# Patient Record
Sex: MALE | Race: WHITE | Employment: OTHER | URBAN - METROPOLITAN AREA
[De-identification: names, ages, dates, MRNs, and addresses within clinical notes are randomized per-mention and may not be internally consistent; named-entity substitution may affect disease eponyms.]

---

## 2021-02-27 ENCOUNTER — IMMUNIZATION (OUTPATIENT)
Dept: NURSING | Facility: CLINIC | Age: 86
End: 2021-02-27
Payer: MEDICARE

## 2021-02-27 PROCEDURE — 91301 PR COVID VAC MODERNA 100 MCG/0.5 ML IM: CPT

## 2021-02-27 PROCEDURE — 0011A PR COVID VAC MODERNA 100 MCG/0.5 ML IM: CPT

## 2021-03-27 ENCOUNTER — IMMUNIZATION (OUTPATIENT)
Dept: NURSING | Facility: CLINIC | Age: 86
End: 2021-03-27
Attending: INTERNAL MEDICINE
Payer: MEDICARE

## 2021-03-27 PROCEDURE — 91301 PR COVID VAC MODERNA 100 MCG/0.5 ML IM: CPT

## 2021-03-27 PROCEDURE — 0012A PR COVID VAC MODERNA 100 MCG/0.5 ML IM: CPT

## 2023-06-06 ENCOUNTER — TRANSFERRED RECORDS (OUTPATIENT)
Dept: HEALTH INFORMATION MANAGEMENT | Facility: CLINIC | Age: 88
End: 2023-06-06
Payer: MEDICARE

## 2023-07-12 ENCOUNTER — ANESTHESIA EVENT (OUTPATIENT)
Dept: SURGERY | Facility: CLINIC | Age: 88
End: 2023-07-12
Payer: MEDICARE

## 2023-07-12 RX ORDER — TRIAMCINOLONE ACETONIDE 1 MG/G
CREAM TOPICAL 2 TIMES DAILY
COMMUNITY

## 2023-07-12 RX ORDER — ATORVASTATIN CALCIUM 20 MG/1
20 TABLET, FILM COATED ORAL DAILY
COMMUNITY

## 2023-07-12 RX ORDER — AZITHROMYCIN 250 MG/1
250 TABLET, FILM COATED ORAL DAILY
Status: ON HOLD | COMMUNITY
End: 2023-07-13

## 2023-07-12 RX ORDER — LEVOTHYROXINE SODIUM 88 UG/1
88 TABLET ORAL DAILY
COMMUNITY

## 2023-07-12 RX ORDER — CHLORAL HYDRATE 500 MG
2 CAPSULE ORAL DAILY
COMMUNITY

## 2023-07-12 RX ORDER — IPRATROPIUM BROMIDE AND ALBUTEROL SULFATE .5; 3 MG/3ML; MG/3ML
1 SOLUTION RESPIRATORY (INHALATION) EVERY 6 HOURS PRN
COMMUNITY

## 2023-07-12 RX ORDER — MONTELUKAST SODIUM 10 MG/1
10 TABLET ORAL AT BEDTIME
COMMUNITY

## 2023-07-12 ASSESSMENT — COPD QUESTIONNAIRES
COPD: 1
CAT_SEVERITY: MILD

## 2023-07-12 NOTE — ANESTHESIA PREPROCEDURE EVALUATION
Anesthesia Pre-Procedure Evaluation    Patient: Héctor Black   MRN: [id number] : 1935        Procedure : Procedure(s):  ROBOTIC BILATERAL INGUINAL HERNIA REPAIR WITH MESH POSSIBLE OPEN          Past Medical History:   Diagnosis Date     Arrhythmia     New QT Prolongation     Cerebral artery occlusion with cerebral infarction (H)     Possible TIA     COPD (chronic obstructive pulmonary disease) (H)      Coronary artery disease      Hyperlipidemia      Hypertension      Hypothyroidism      Nasal sinus polyp      Uncomplicated asthma       Past Surgical History:   Procedure Laterality Date     APPENDECTOMY       CARDIAC SURGERY      ANGIOPLASTY     ENT SURGERY      SINUS SURGERY, SINUS POLYPECTOMY     EYE SURGERY      CATARACT EXTRACTION- RIGHT & LEFT     GENITOURINARY SURGERY      TURP     GI SURGERY       LAPAROSCOPIC HERNIORRHAPHY HIATAL       ORTHOPEDIC SURGERY      TOTAL KNEE ARTHROPLASTY- RIGHT & LEFT      Allergies   Allergen Reactions     Amoxicillin Hives     Penicillins Hives     Levaquin [Levofloxacin] Rash     Sulfa Antibiotics Rash      Social History     Tobacco Use     Smoking status: Former     Types: Cigars     Quit date: 1986     Years since quittin.4     Smokeless tobacco: Never   Substance Use Topics     Alcohol use: Yes     Comment: occasionally- 2/WEEK      Wt Readings from Last 1 Encounters:   09/10/13 94.4 kg (208 lb 1.6 oz)        Anesthesia Evaluation            ROS/MED HX  ENT/Pulmonary:     (+) asthma mild,  COPD,     Neurologic:     (+) TIA, date: ,     Cardiovascular:     (+) Dyslipidemia hypertension--CAD --- (-) stent   METS/Exercise Tolerance:     Hematologic:       Musculoskeletal: Comment: BIH      GI/Hepatic:    (-) GERD   Renal/Genitourinary:  - neg Renal ROS     Endo:     (+) thyroid problem, hypothyroidism,     Psychiatric/Substance Use:       Infectious Disease:       Malignancy:       Other:            Physical Exam    Airway        Mallampati: II    TM distance: > 3 FB   Neck ROM: full   Mouth opening: > 3 cm    Respiratory Devices and Support         Dental           Cardiovascular   cardiovascular exam normal          Pulmonary   pulmonary exam normal                OUTSIDE LABS:  CBC:   Lab Results   Component Value Date    WBC 10.3 06/02/2016    HGB 15.0 06/02/2016    HCT 44.6 06/02/2016     06/02/2016     BMP:   Lab Results   Component Value Date     06/02/2016     05/09/2013    POTASSIUM 4.3 06/02/2016    POTASSIUM 4.3 05/09/2013    CHLORIDE 108 06/02/2016    CHLORIDE 102 05/09/2013    CO2 23 06/02/2016    CO2 25 05/09/2013    BUN 31 (H) 06/02/2016    BUN 22 05/09/2013    CR 1.07 06/02/2016    CR 1.17 05/09/2013    GLC 98 06/02/2016    GLC 87 05/09/2013     COAGS:   Lab Results   Component Value Date    INR 0.96 06/02/2016     POC: No results found for: BGM, HCG, HCGS  HEPATIC:   Lab Results   Component Value Date    ALBUMIN 4.4 05/09/2013    PROTTOTAL 7.7 05/09/2013    ALT 29 05/09/2013    AST 31 05/09/2013    ALKPHOS 56 05/09/2013    BILITOTAL 0.6 05/09/2013     OTHER:   Lab Results   Component Value Date    HEYDI 8.7 06/02/2016       Anesthesia Plan    ASA Status:  2   NPO Status:  NPO Appropriate    Anesthesia Type: General.     - Airway: ETT   Induction: Intravenous, Propofol.   Maintenance: Balanced.        Consents    Anesthesia Plan(s) and associated risks, benefits, and realistic alternatives discussed. Questions answered and patient/representative(s) expressed understanding.    - Discussed:     - Discussed with:  Patient         Postoperative Care    Pain management: IV analgesics.   PONV prophylaxis: Ondansetron (or other 5HT-3)     Comments:                Perez Schroeder, , DO

## 2023-07-13 ENCOUNTER — HOSPITAL ENCOUNTER (OUTPATIENT)
Facility: CLINIC | Age: 88
Discharge: HOME | End: 2023-07-13
Attending: SURGERY | Admitting: SURGERY
Payer: MEDICARE

## 2023-07-13 ENCOUNTER — ANESTHESIA (OUTPATIENT)
Dept: SURGERY | Facility: CLINIC | Age: 88
End: 2023-07-13
Payer: MEDICARE

## 2023-07-13 VITALS
RESPIRATION RATE: 16 BRPM | SYSTOLIC BLOOD PRESSURE: 144 MMHG | BODY MASS INDEX: 30.08 KG/M2 | HEART RATE: 62 BPM | TEMPERATURE: 98 F | WEIGHT: 210.1 LBS | DIASTOLIC BLOOD PRESSURE: 72 MMHG | OXYGEN SATURATION: 96 % | HEIGHT: 70 IN

## 2023-07-13 DIAGNOSIS — Z87.19 S/P INGUINAL HERNIA REPAIR: Primary | ICD-10-CM

## 2023-07-13 DIAGNOSIS — Z98.890 S/P INGUINAL HERNIA REPAIR: Primary | ICD-10-CM

## 2023-07-13 PROCEDURE — 258N000003 HC RX IP 258 OP 636: Performed by: NURSE ANESTHETIST, CERTIFIED REGISTERED

## 2023-07-13 PROCEDURE — 250N000011 HC RX IP 250 OP 636: Performed by: STUDENT IN AN ORGANIZED HEALTH CARE EDUCATION/TRAINING PROGRAM

## 2023-07-13 PROCEDURE — 272N000001 HC OR GENERAL SUPPLY STERILE: Performed by: SURGERY

## 2023-07-13 PROCEDURE — 250N000011 HC RX IP 250 OP 636: Performed by: NURSE ANESTHETIST, CERTIFIED REGISTERED

## 2023-07-13 PROCEDURE — 360N000080 HC SURGERY LEVEL 7, PER MIN: Performed by: SURGERY

## 2023-07-13 PROCEDURE — 250N000009 HC RX 250: Performed by: NURSE ANESTHETIST, CERTIFIED REGISTERED

## 2023-07-13 PROCEDURE — 710N000012 HC RECOVERY PHASE 2, PER MINUTE: Performed by: SURGERY

## 2023-07-13 PROCEDURE — 250N000025 HC SEVOFLURANE, PER MIN: Performed by: SURGERY

## 2023-07-13 PROCEDURE — C1781 MESH (IMPLANTABLE): HCPCS | Performed by: SURGERY

## 2023-07-13 PROCEDURE — 999N000141 HC STATISTIC PRE-PROCEDURE NURSING ASSESSMENT: Performed by: SURGERY

## 2023-07-13 PROCEDURE — 370N000017 HC ANESTHESIA TECHNICAL FEE, PER MIN: Performed by: SURGERY

## 2023-07-13 PROCEDURE — 250N000009 HC RX 250: Performed by: SURGERY

## 2023-07-13 PROCEDURE — 250N000013 HC RX MED GY IP 250 OP 250 PS 637: Performed by: STUDENT IN AN ORGANIZED HEALTH CARE EDUCATION/TRAINING PROGRAM

## 2023-07-13 PROCEDURE — 710N000009 HC RECOVERY PHASE 1, LEVEL 1, PER MIN: Performed by: SURGERY

## 2023-07-13 DEVICE — MESH DEXTILE ANATOMICAL 15CM X 10CM LG LEFT DXT1510AL: Type: IMPLANTABLE DEVICE | Site: INGUINAL | Status: FUNCTIONAL

## 2023-07-13 DEVICE — MESH DEXTILE ANATOMICAL 15CM X 10CM LG RIGHT DXT1510AR: Type: IMPLANTABLE DEVICE | Site: INGUINAL | Status: FUNCTIONAL

## 2023-07-13 RX ORDER — TRAMADOL HYDROCHLORIDE 50 MG/1
50 TABLET ORAL EVERY 6 HOURS PRN
Qty: 12 TABLET | Refills: 0 | Status: SHIPPED | OUTPATIENT
Start: 2023-07-13 | End: 2023-07-16

## 2023-07-13 RX ORDER — DEXAMETHASONE SODIUM PHOSPHATE 4 MG/ML
INJECTION, SOLUTION INTRA-ARTICULAR; INTRALESIONAL; INTRAMUSCULAR; INTRAVENOUS; SOFT TISSUE PRN
Status: DISCONTINUED | OUTPATIENT
Start: 2023-07-13 | End: 2023-07-13

## 2023-07-13 RX ORDER — ONDANSETRON 2 MG/ML
INJECTION INTRAMUSCULAR; INTRAVENOUS PRN
Status: DISCONTINUED | OUTPATIENT
Start: 2023-07-13 | End: 2023-07-13

## 2023-07-13 RX ORDER — BUPIVACAINE HYDROCHLORIDE AND EPINEPHRINE 2.5; 5 MG/ML; UG/ML
INJECTION, SOLUTION INFILTRATION; PERINEURAL PRN
Status: DISCONTINUED | OUTPATIENT
Start: 2023-07-13 | End: 2023-07-13 | Stop reason: HOSPADM

## 2023-07-13 RX ORDER — METHOCARBAMOL 750 MG/1
750 TABLET, FILM COATED ORAL
Status: DISCONTINUED | OUTPATIENT
Start: 2023-07-13 | End: 2023-07-13 | Stop reason: HOSPADM

## 2023-07-13 RX ORDER — SODIUM CHLORIDE, SODIUM LACTATE, POTASSIUM CHLORIDE, CALCIUM CHLORIDE 600; 310; 30; 20 MG/100ML; MG/100ML; MG/100ML; MG/100ML
INJECTION, SOLUTION INTRAVENOUS CONTINUOUS PRN
Status: DISCONTINUED | OUTPATIENT
Start: 2023-07-13 | End: 2023-07-13

## 2023-07-13 RX ORDER — FENTANYL CITRATE 0.05 MG/ML
25 INJECTION, SOLUTION INTRAMUSCULAR; INTRAVENOUS EVERY 5 MIN PRN
Status: DISCONTINUED | OUTPATIENT
Start: 2023-07-13 | End: 2023-07-13 | Stop reason: HOSPADM

## 2023-07-13 RX ORDER — GABAPENTIN 600 MG/1
600 TABLET ORAL ONCE
Status: COMPLETED | OUTPATIENT
Start: 2023-07-13 | End: 2023-07-13

## 2023-07-13 RX ORDER — HYDROCODONE BITARTRATE AND ACETAMINOPHEN 5; 325 MG/1; MG/1
1 TABLET ORAL
Status: DISCONTINUED | OUTPATIENT
Start: 2023-07-13 | End: 2023-07-13 | Stop reason: HOSPADM

## 2023-07-13 RX ORDER — ONDANSETRON 2 MG/ML
4 INJECTION INTRAMUSCULAR; INTRAVENOUS EVERY 30 MIN PRN
Status: DISCONTINUED | OUTPATIENT
Start: 2023-07-13 | End: 2023-07-13 | Stop reason: HOSPADM

## 2023-07-13 RX ORDER — LIDOCAINE HYDROCHLORIDE 20 MG/ML
INJECTION, SOLUTION INFILTRATION; PERINEURAL PRN
Status: DISCONTINUED | OUTPATIENT
Start: 2023-07-13 | End: 2023-07-13

## 2023-07-13 RX ORDER — HYDROMORPHONE HCL IN WATER/PF 6 MG/30 ML
0.2 PATIENT CONTROLLED ANALGESIA SYRINGE INTRAVENOUS EVERY 5 MIN PRN
Status: DISCONTINUED | OUTPATIENT
Start: 2023-07-13 | End: 2023-07-13 | Stop reason: HOSPADM

## 2023-07-13 RX ORDER — HYDROMORPHONE HCL IN WATER/PF 6 MG/30 ML
0.4 PATIENT CONTROLLED ANALGESIA SYRINGE INTRAVENOUS EVERY 5 MIN PRN
Status: DISCONTINUED | OUTPATIENT
Start: 2023-07-13 | End: 2023-07-13 | Stop reason: HOSPADM

## 2023-07-13 RX ORDER — MAGNESIUM HYDROXIDE 1200 MG/15ML
LIQUID ORAL PRN
Status: DISCONTINUED | OUTPATIENT
Start: 2023-07-13 | End: 2023-07-13 | Stop reason: HOSPADM

## 2023-07-13 RX ORDER — DEXMEDETOMIDINE HYDROCHLORIDE 4 UG/ML
INJECTION, SOLUTION INTRAVENOUS PRN
Status: DISCONTINUED | OUTPATIENT
Start: 2023-07-13 | End: 2023-07-13

## 2023-07-13 RX ORDER — SODIUM CHLORIDE, SODIUM LACTATE, POTASSIUM CHLORIDE, CALCIUM CHLORIDE 600; 310; 30; 20 MG/100ML; MG/100ML; MG/100ML; MG/100ML
INJECTION, SOLUTION INTRAVENOUS CONTINUOUS
Status: DISCONTINUED | OUTPATIENT
Start: 2023-07-13 | End: 2023-07-13 | Stop reason: HOSPADM

## 2023-07-13 RX ORDER — PROPOFOL 10 MG/ML
INJECTION, EMULSION INTRAVENOUS CONTINUOUS PRN
Status: DISCONTINUED | OUTPATIENT
Start: 2023-07-13 | End: 2023-07-13

## 2023-07-13 RX ORDER — CEFAZOLIN SODIUM/WATER 2 G/20 ML
2 SYRINGE (ML) INTRAVENOUS
Status: COMPLETED | OUTPATIENT
Start: 2023-07-13 | End: 2023-07-13

## 2023-07-13 RX ORDER — ACETAMINOPHEN 325 MG/1
650 TABLET ORAL
Status: DISCONTINUED | OUTPATIENT
Start: 2023-07-13 | End: 2023-07-13 | Stop reason: HOSPADM

## 2023-07-13 RX ORDER — ACETAMINOPHEN 325 MG/1
975 TABLET ORAL ONCE
Status: COMPLETED | OUTPATIENT
Start: 2023-07-13 | End: 2023-07-13

## 2023-07-13 RX ORDER — ONDANSETRON 4 MG/1
4 TABLET, ORALLY DISINTEGRATING ORAL
Status: DISCONTINUED | OUTPATIENT
Start: 2023-07-13 | End: 2023-07-13 | Stop reason: HOSPADM

## 2023-07-13 RX ORDER — PROPOFOL 10 MG/ML
INJECTION, EMULSION INTRAVENOUS PRN
Status: DISCONTINUED | OUTPATIENT
Start: 2023-07-13 | End: 2023-07-13

## 2023-07-13 RX ORDER — CELECOXIB 200 MG/1
200 CAPSULE ORAL ONCE
Status: COMPLETED | OUTPATIENT
Start: 2023-07-13 | End: 2023-07-13

## 2023-07-13 RX ORDER — FENTANYL CITRATE 50 UG/ML
INJECTION, SOLUTION INTRAMUSCULAR; INTRAVENOUS PRN
Status: DISCONTINUED | OUTPATIENT
Start: 2023-07-13 | End: 2023-07-13

## 2023-07-13 RX ORDER — CEFAZOLIN SODIUM/WATER 2 G/20 ML
2 SYRINGE (ML) INTRAVENOUS SEE ADMIN INSTRUCTIONS
Status: DISCONTINUED | OUTPATIENT
Start: 2023-07-13 | End: 2023-07-13 | Stop reason: HOSPADM

## 2023-07-13 RX ORDER — ONDANSETRON 4 MG/1
4 TABLET, ORALLY DISINTEGRATING ORAL EVERY 30 MIN PRN
Status: DISCONTINUED | OUTPATIENT
Start: 2023-07-13 | End: 2023-07-13 | Stop reason: HOSPADM

## 2023-07-13 RX ORDER — FENTANYL CITRATE 0.05 MG/ML
50 INJECTION, SOLUTION INTRAMUSCULAR; INTRAVENOUS EVERY 5 MIN PRN
Status: DISCONTINUED | OUTPATIENT
Start: 2023-07-13 | End: 2023-07-13 | Stop reason: HOSPADM

## 2023-07-13 RX ADMIN — SODIUM CHLORIDE, POTASSIUM CHLORIDE, SODIUM LACTATE AND CALCIUM CHLORIDE: 600; 310; 30; 20 INJECTION, SOLUTION INTRAVENOUS at 09:05

## 2023-07-13 RX ADMIN — HYDROMORPHONE HYDROCHLORIDE 0.5 MG: 1 INJECTION, SOLUTION INTRAMUSCULAR; INTRAVENOUS; SUBCUTANEOUS at 11:32

## 2023-07-13 RX ADMIN — FENTANYL CITRATE 100 MCG: 50 INJECTION, SOLUTION INTRAMUSCULAR; INTRAVENOUS at 11:00

## 2023-07-13 RX ADMIN — DEXMEDETOMIDINE HYDROCHLORIDE 12 MCG: 200 INJECTION INTRAVENOUS at 11:39

## 2023-07-13 RX ADMIN — GABAPENTIN 600 MG: 600 TABLET, FILM COATED ORAL at 09:31

## 2023-07-13 RX ADMIN — SUGAMMADEX 200 MG: 100 INJECTION, SOLUTION INTRAVENOUS at 13:04

## 2023-07-13 RX ADMIN — ACETAMINOPHEN 975 MG: 325 TABLET, FILM COATED ORAL at 09:31

## 2023-07-13 RX ADMIN — PHENYLEPHRINE HYDROCHLORIDE 0.2 MCG/KG/MIN: 10 INJECTION INTRAVENOUS at 11:56

## 2023-07-13 RX ADMIN — ONDANSETRON 4 MG: 2 INJECTION INTRAMUSCULAR; INTRAVENOUS at 12:52

## 2023-07-13 RX ADMIN — Medication 2 G: at 11:03

## 2023-07-13 RX ADMIN — CELECOXIB 200 MG: 200 CAPSULE ORAL at 09:31

## 2023-07-13 RX ADMIN — ROCURONIUM BROMIDE 10 MG: 50 INJECTION, SOLUTION INTRAVENOUS at 12:27

## 2023-07-13 RX ADMIN — DEXMEDETOMIDINE HYDROCHLORIDE 8 MCG: 200 INJECTION INTRAVENOUS at 11:46

## 2023-07-13 RX ADMIN — ROCURONIUM BROMIDE 50 MG: 50 INJECTION, SOLUTION INTRAVENOUS at 11:00

## 2023-07-13 RX ADMIN — LIDOCAINE HYDROCHLORIDE 80 MG: 20 INJECTION, SOLUTION INFILTRATION; PERINEURAL at 11:00

## 2023-07-13 RX ADMIN — ROCURONIUM BROMIDE 10 MG: 50 INJECTION, SOLUTION INTRAVENOUS at 12:44

## 2023-07-13 RX ADMIN — PROPOFOL 20 MCG/KG/MIN: 10 INJECTION, EMULSION INTRAVENOUS at 11:02

## 2023-07-13 RX ADMIN — PHENYLEPHRINE HYDROCHLORIDE 100 MCG: 10 INJECTION INTRAVENOUS at 11:00

## 2023-07-13 RX ADMIN — DEXAMETHASONE SODIUM PHOSPHATE 4 MG: 4 INJECTION, SOLUTION INTRA-ARTICULAR; INTRALESIONAL; INTRAMUSCULAR; INTRAVENOUS; SOFT TISSUE at 11:21

## 2023-07-13 RX ADMIN — PROPOFOL 150 MG: 10 INJECTION, EMULSION INTRAVENOUS at 11:00

## 2023-07-13 RX ADMIN — ROCURONIUM BROMIDE 20 MG: 50 INJECTION, SOLUTION INTRAVENOUS at 11:35

## 2023-07-13 ASSESSMENT — ACTIVITIES OF DAILY LIVING (ADL)
ADLS_ACUITY_SCORE: 35

## 2023-07-13 NOTE — OP NOTE
OPERATIVE REPORT    Surgeon(s)/Proceduralist(s) and Assistants (if any):  Surgeon(s):  Dipak Chen MD  Circulator: Jeaneth Murdokc RN; Gretchen Hebert RN  Physician Assistant: Margaret Quintana PA-C  Relief Scrub: Saul Ba  Scrub Person: ANNA KRUSE; Brielle Mcneill    Pre-operative/Pre-procedural Diagnosis:  Bilateral inguinal hernia [K40.20]    Procedure(s):  Procedure(s):  ROBOTIC BILATERAL INGUINAL HERNIA REPAIR WITH MESH (Bilateral).  Robotic bilateral femoral hernia repair    Procedure(s) findings:   Bilateral inguinal hernias containing indirect and direct components.  Bilateral femoral hernia    Specimen(s) removed:   None    (EBL) Estimated blood loss (ml): 10 mL    Postoperative/Postprocedure Diagnosis:   Bilateral inguinal hernias.  Bilateral femoral hernia    Description of the Procedure:    After patient identification was performed and preoperative antibiotics given, the patient was taken to the operating room and placed supine on the operating room table. Gen. anesthesia was induced. The abdomen was prepped and draped in standard surgical fashion.       Due to the patient's previous surgical operation, we decided to have our initial access port in the left upper quadrant, Medina's point.  The skin was anesthetized with Marcaine and a 5 mm incision was performed.  The Veress needle was introduced into the abdominal cavity.   Pneumoperitoneum to 15 torr was induced. The Veress needle was withdrawn and a laparoscopic 5 mm port was placed in the peritoneal cavity.  We inspected the underlying viscera for signs of Veress needle or trocar injury and none were identified.  We continued with insertion of the reminder of the ports, which was performed under direct laparoscopic visualization.  After the skin was anesthetized with Marcaine, a robotic 8 mm port was placed immediately superior to the umbilicus, just to the left of the midline.  A second robotic 8 mm port was placed on  the right midclavicular line, approximately 2 cm above the level of the umbilicus.  The third robotic 8 mm port was placed on the left midclavicular line approximately 2 cm above the umbilicus.  Inspection of the patient's pelvis was performed.  There was a large right inguinal hernia with the direct and indirect component.  The femoral hernia was also present on the right side.  Inspection of the left groin also revealed a left inguinal hernia with a direct and indirect component.  A left femoral hernia was also present.  There was diastasis of the recti abdominis muscle in the midline at subumbilical level.    The patient was placed in Trendelenburg position and the [employer] surgical robot was docked from the patient's left side.     We turned our attention towards right inguinal hernia, which was more symptomatic. This was a pantaloon hernia. The peritoneum was incised transversely starting at the level of the medial umbilical ligament and progressing laterally for approximately 18 cm. A peritoneal flap was raised inferiorly and laterally, reducing the right indirect inguinal hernia sac from the inguinal canal and from Hesselbach's triangle.  Testicular vessels and vas deferens were identified and protected throughout the case.  Attention was turned to the femoral canal.  The hernia sac was reduced from the femoral canal as well.  The entire myopectineal orifice was dissected, with our dissection progressing medially past the midline, inferiorly for 2 cm below Dioni's ligament and laterally to the level of the anterior superior iliac spine the peritoneal flap was dissected off the cord elements so that it would not tent the mesh when it is brought back into anatomical position.  A 10 x 15 cm Dextile mesh was placed in the abdominal cavity and directed towards the right preperitoneal space.  The mesh was positioned in such a way to cover the deep inguinal ring, Hesselbach's triangle, and the femoral canal.   The  mesh was secured with three 2-0 PDS sutures, placed at the level of Dioni's ligament, the superior medial edge through the belly of the rectus abdominis muscle and the superior lateral edge, immediately lateral to the inferior epigastric vessels.    Attention was turned to the left inguinal hernia. A moderate size pantaloon hernia was present at this level.  There was also a femoral hernia.   The peritoneum was incised transversely starting at the level of the medial umbilical ligament and progressing laterally for approximately 18 cm. A peritoneal flap was raised inferiorly and laterally, reducing the incarcerated preperitoneal fat from the direct defect and the hernia sac from the inguinal canal.  Testicular vessels and vas deferens were identified and protected throughout the case.  Attention was turned to the left femoral hernia.  The hernia sac was reduced from the femoral canal.  The entire myopectineal orifice was dissected, with our dissection progressing medially past the midline, inferiorly for 2 cm below Dioni's ligament and laterally to the level of the anterior superior iliac spine the peritoneal flap was dissected off the cord elements so that it would not tent the mesh when it is brought back into anatomical position.  A second piece of Dextile mesh measuring 10 x 15 cm was placed in the abdominal cavity and directed towards the left preperitoneal space.  The mesh was positioned in such a way to cover the deep inguinal ring, Hesselbach's triangle, and the femoral canal.  The mesh was secured with three 2-0 PDS sutures, placed at the level of Dioni's ligament, the superior medial edge through the belly of the rectus abdominis muscle and the superior lateral edge, immediately lateral to the inferior epigastric vessels.  The 2 meshes overlapped in the midline.  Once adequate position of the mesh was achieved, the peritoneal flap was closed with two 3.0 running barbed sutures.    The robot was undocked  and all instruments removed. The abdominal cavity was once again inspected and no other pathology was identified. The laparoscopic needles were removed. All robotic ports were subsequently removed. The skin of the 3 incisions was closed with subcuticular Monocryl followed by Dermabond.    The patient tolerated the procedure well. He was awakened from anesthesia and moved to the postanesthesia care unit in stable condition. All instrument, needle and sponge complications were correct at the end of the case. There were no complications. I was present in the operating room throughout the entire duration of the procedure.    Dipak Chen MD, FACS 7/13/2023 1:44 PM  Minimally Invasive & Bariatric Surgery   [facility]

## 2023-07-13 NOTE — ANESTHESIA PROCEDURE NOTES
Airway       Patient location during procedure: OR       Procedure Start/Stop Times: 7/13/2023 11:05 AM  Staff -        Anesthesiologist:  Perez Schroeder DO       Resident/Fellow: Elena Ocampo       CRNA: Nikki Miller APRN CRNA       Performed By: [facility] and with CRNAs       Procedure performed by resident/fellow/CRNA in presence of a teaching physician.    Consent for Airway        Urgency: elective  Indications and Patient Condition       Indications for airway management: kelsie-procedural       Induction type:intravenous       Mask difficulty assessment: 2 - vent by mask + OA or adjuvant +/- NMBA    Final Airway Details       Final airway type: endotracheal airway       Successful airway: ETT - single  Endotracheal Airway Details        ETT size (mm): 8.0       Cuffed: yes       Successful intubation technique: direct laryngoscopy       DL Blade Type: MAC 3       Grade View of Cords: 2       Adjucts: stylet       Position: Right       Measured from: gums/teeth       Secured at (cm): 24       Bite block used: None    Post intubation assessment        Placement verified by: capnometry, equal breath sounds and chest rise        Number of attempts at approach: 1       Secured with: pink tape       Ease of procedure: easy       Dentition: Unchanged and Intact    Medication(s) Administered   Medication Administration Time: 7/13/2023 11:05 AM

## 2023-07-13 NOTE — INTERVAL H&P NOTE
"I have reviewed the surgical (or preoperative) H&P that is linked to this encounter, and examined the patient. There are no significant changes    Clinical Conditions Present on Arrival:  Clinically Significant Risk Factors Present on Admission                 # Drug Induced Platelet Defect: home medication list includes an antiplatelet medication  # Obesity: Estimated body mass index is 30.15 kg/m  as calculated from the following:    Height as of this encounter: 1.778 m (5' 10\").    Weight as of this encounter: 95.3 kg (210 lb 1.6 oz).       "

## 2023-07-13 NOTE — DISCHARGE INSTRUCTIONS
Same Day Surgery Discharge Instructions for  Sedation and General Anesthesia     It's not unusual to feel dizzy, light-headed or faint for up to 24 hours after surgery or while taking pain medication.  If you have these symptoms: sit for a few minutes before standing and have someone assist you when you get up to walk or use the bathroom.    You should rest and relax for the next 24 hours. We recommend you make arrangements to have an adult stay with you for at least 24 hours after your discharge.  Avoid hazardous and strenuous activity.    DO NOT DRIVE any vehicle or operate mechanical equipment for 24 hours following the end of your surgery.  Even though you may feel normal, your reactions may be affected by the medication you have received.    Do not drink alcoholic beverages for 24 hours following surgery.     Slowly progress to your regular diet as you feel able. It's not unusual to feel nauseated and/or vomit after receiving anesthesia.  If you develop these symptoms, drink clear liquids (apple juice, ginger ale, broth, 7-up, etc. ) until you feel better.  If your nausea and vomiting persists for 24 hours, please notify your surgeon.      All narcotic pain medications, along with inactivity and anesthesia, can cause constipation. Drinking plenty of liquids and increasing fiber intake will help.    For any questions of a medical nature, call your surgeon.    Do not make important decisions for 24 hours.    If you had general anesthesia, you may have a sore throat for a couple of days related to the breathing tube used during surgery.  You may use Cepacol lozenges to help with this discomfort.  If it worsens or if you develop a fever, contact your surgeon.     If you feel your pain is not well managed with the pain medications prescribed by your surgeon, please contact your surgeon's office to let them know so they can address your concerns.            Today you were given 975 mg of Tylenol at 9:30 AM. The  recommended daily maximum dose is 4000 mg.        While you were at the hospital today you received Celebrex at 9:30 AM, an antiinflammatory medication similar to Ibuprofen.  You should not take other antiinflammatory medication, such as Ibuprofen, Motrin, Advil, Aleve, Naprosyn, etc, for at least 8 hours.          Reasons to contact your surgeon:    Signs of possible infection: Check your incision daily for redness, swelling, warmth, red streaks or foul drainage.   Elevated temperature.  Pain not controlled with pain medication and/or rest.   Uncontrolled nausea or vomiting.  Any questions or concerns.       Liquid Adhesive   General Care:  Your incision was closed using a liquid adhesive. Do not pick, scratch or rub the site.    Most  wounds heal without problems. However, an infection sometimes occurs despite proper treatment. Therefore, watch for the signs of infection listed below.  Keep the wound clean and dry however, shower or bathe as instructed by your surgeon.  Do not use soaps, lotions, or ointments on the wound area. Do not scrub the wound. After bathing, pat the wound dry with a soft towel.  Call your provider if you have:  Temperature of 101 F or higher,    Redness, swelling, drainage or warmth at incision site.   Wound bleeds more than a small amount or bleeding doesn t stop  Wound edges come apart         **If you have questions or concerns about your procedure, call   Dr. Chen at [phone].**

## 2023-07-13 NOTE — OR NURSING
Patient ambulated to bathroom with assist and attempted to void. States he wants to go home and thinks he can void there. Instructed patient to go to ER if unable to void  later this evening

## 2023-07-13 NOTE — ANESTHESIA POSTPROCEDURE EVALUATION
Patient: Héctor Black    Procedure: Procedure(s):  ROBOTIC BILATERAL INGUINAL HERNIA REPAIR WITH MESH       Anesthesia Type:  General    Note:     Postop Pain Control: Uneventful            Sign Out: Well controlled pain   PONV: No   Neuro/Psych: Uneventful            Sign Out: Acceptable/Baseline neuro status   Airway/Respiratory: Uneventful            Sign Out: Acceptable/Baseline resp. status   CV/Hemodynamics: Uneventful            Sign Out: Acceptable CV status; No obvious hypovolemia; No obvious fluid overload   Other NRE: NONE   DID A NON-ROUTINE EVENT OCCUR?            Last vitals:  Vitals Value Taken Time   /73 07/13/23 1415   Temp 36.7  C (98  F) 07/13/23 1415   Pulse 68 07/13/23 1423   Resp 11 07/13/23 1423   SpO2 90 % 07/13/23 1424   Vitals shown include unvalidated device data.    Electronically Signed By: Perez Schroeder DO, DO  July 13, 2023  3:21 PM

## 2023-07-13 NOTE — ANESTHESIA CARE TRANSFER NOTE
Patient: Héctor Black    Procedure: Procedure(s):  ROBOTIC BILATERAL INGUINAL HERNIA REPAIR WITH MESH       Diagnosis: Bilateral inguinal hernia [K40.20]  Diagnosis Additional Information: No value filed.    Anesthesia Type:   General     Note:    Oropharynx: oropharynx clear of all foreign objects  Level of Consciousness: drowsy  Oxygen Supplementation: face mask    Independent Airway: airway patency satisfactory and stable  Dentition: dentition unchanged  Vital Signs Stable: post-procedure vital signs reviewed and stable  Report to RN Given: handoff report given  Patient transferred to: PACU    Handoff Report: Identifed the Patient, Identified the Reponsible Provider, Reviewed the pertinent medical history, Discussed the surgical course, Reviewed Intra-OP anesthesia mangement and issues during anesthesia, Set expectations for post-procedure period and Allowed opportunity for questions and acknowledgement of understanding      Vitals:  Vitals Value Taken Time   BP     Temp     Pulse 63 07/13/23 1321   Resp 11 07/13/23 1321   SpO2 98 % 07/13/23 1321   Vitals shown include unvalidated device data.    Electronically Signed By: JESS Mcdowell CRNA  July 13, 2023  1:23 PM

## 2024-07-17 ENCOUNTER — HOSPITAL ENCOUNTER (EMERGENCY)
Facility: HOSPITAL | Age: 89
Discharge: STILL ADMITTED OR ADMINISTRATIVE | End: 2024-07-17
Payer: MEDICARE

## 2024-08-04 ENCOUNTER — HEALTH MAINTENANCE LETTER (OUTPATIENT)
Age: 89
End: 2024-08-04

## 2025-08-16 ENCOUNTER — HEALTH MAINTENANCE LETTER (OUTPATIENT)
Age: OVER 89
End: 2025-08-16

## (undated) DEVICE — SU DERMABOND MINI DHVM12

## (undated) DEVICE — DAVINCI XI OBTURATOR BLADELESS 8MM 470359

## (undated) DEVICE — LIGHT HANDLE X2

## (undated) DEVICE — DAVINCI XI DRAPE ARM 470015

## (undated) DEVICE — PACK LAP CHOLE SLC15LCFSD

## (undated) DEVICE — DAVINCI XI ESU FORCE BIPOLAR 8MM 471405

## (undated) DEVICE — DRAPE SHEET REV FOLD 3/4 9349

## (undated) DEVICE — DAVINCI HOT SHEARS TIP COVER  400180

## (undated) DEVICE — DAVINCI XI NDL DRIVER MEGA SUTURE CUT 8MM 470309

## (undated) DEVICE — DAVINCI XI SEAL UNIVERSAL 5-8MM 470361

## (undated) DEVICE — LUBRICANT INST ELECTROLUBE EL101

## (undated) DEVICE — SU MONOCRYL 4-0 PS-2 18" UND Y496G

## (undated) DEVICE — GLOVE BIOGEL PI MICRO SZ 8.0 48580

## (undated) DEVICE — DECANTER VIAL 2006S

## (undated) DEVICE — SU VICRYL 0 UR-6 27" J603H

## (undated) DEVICE — PREP CHLORAPREP 26ML TINTED HI-LITE ORANGE 930815

## (undated) DEVICE — SYSTEM LAPAROVUE VISIBILITY LAPVUE10

## (undated) DEVICE — SYR 10ML FINGER CONTROL W/O NDL 309695

## (undated) DEVICE — DAVINCI XI DRAPE COLUMN 470341

## (undated) DEVICE — SU PDS II 3-0 SH 27" Z316H

## (undated) DEVICE — NDL INSUFFLATION 13GA 120MM C2201

## (undated) DEVICE — ENDO TROCAR FIRST ENTRY KII FIOS Z-THRD 05X100MM CTF03

## (undated) DEVICE — GOWN IMPERVIOUS SPECIALTY XLG/XLONG 32474

## (undated) DEVICE — SU STRATAFIX PDS PLUS 3-0 SPIRAL SH 15CM SXPP1B420

## (undated) DEVICE — DAVINCI XI MONOPOLAR SCISSORS HOT SHEARS 8MM 470179

## (undated) DEVICE — LINEN TOWEL PACK X5 5464

## (undated) DEVICE — EVAC SYSTEM CLEAR FLOW SC082500

## (undated) DEVICE — BLADE CLIPPER 4406

## (undated) RX ORDER — CEFAZOLIN SODIUM/WATER 2 G/20 ML
SYRINGE (ML) INTRAVENOUS
Status: DISPENSED
Start: 2023-07-13

## (undated) RX ORDER — GABAPENTIN 600 MG/1
TABLET ORAL
Status: DISPENSED
Start: 2023-07-13

## (undated) RX ORDER — BUPIVACAINE HYDROCHLORIDE 2.5 MG/ML
INJECTION, SOLUTION EPIDURAL; INFILTRATION; INTRACAUDAL
Status: DISPENSED
Start: 2023-07-13

## (undated) RX ORDER — CELECOXIB 200 MG/1
CAPSULE ORAL
Status: DISPENSED
Start: 2023-07-13

## (undated) RX ORDER — PROPOFOL 10 MG/ML
INJECTION, EMULSION INTRAVENOUS
Status: DISPENSED
Start: 2023-07-13

## (undated) RX ORDER — ACETAMINOPHEN 325 MG/1
TABLET ORAL
Status: DISPENSED
Start: 2023-07-13

## (undated) RX ORDER — EPINEPHRINE 1 MG/ML
INJECTION, SOLUTION INTRAMUSCULAR; SUBCUTANEOUS
Status: DISPENSED
Start: 2023-07-13

## (undated) RX ORDER — FENTANYL CITRATE 50 UG/ML
INJECTION, SOLUTION INTRAMUSCULAR; INTRAVENOUS
Status: DISPENSED
Start: 2023-07-13

## (undated) RX ORDER — HYDROMORPHONE HYDROCHLORIDE 1 MG/ML
INJECTION, SOLUTION INTRAMUSCULAR; INTRAVENOUS; SUBCUTANEOUS
Status: DISPENSED
Start: 2023-07-13